# Patient Record
Sex: MALE | Race: WHITE | NOT HISPANIC OR LATINO | URBAN - METROPOLITAN AREA
[De-identification: names, ages, dates, MRNs, and addresses within clinical notes are randomized per-mention and may not be internally consistent; named-entity substitution may affect disease eponyms.]

---

## 2023-04-15 ENCOUNTER — EMERGENCY (EMERGENCY)
Facility: HOSPITAL | Age: 78
LOS: 0 days | Discharge: HOME | End: 2023-04-15
Attending: EMERGENCY MEDICINE
Payer: MEDICARE

## 2023-04-15 VITALS
OXYGEN SATURATION: 98 % | TEMPERATURE: 96 F | SYSTOLIC BLOOD PRESSURE: 166 MMHG | DIASTOLIC BLOOD PRESSURE: 78 MMHG | HEART RATE: 68 BPM

## 2023-04-15 VITALS — HEIGHT: 69 IN | WEIGHT: 220.02 LBS
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
## 2023-04-15 DIAGNOSIS — S81.812A LACERATION WITHOUT FOREIGN BODY, LEFT LOWER LEG, INITIAL ENCOUNTER: ICD-10-CM
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
## 2023-04-15 PROCEDURE — 73590 X-RAY EXAM OF LOWER LEG: CPT | Mod: LT

## 2023-04-15 PROCEDURE — 73590 X-RAY EXAM OF LOWER LEG: CPT | Mod: 26,LT

## 2023-04-15 PROCEDURE — 99284 EMERGENCY DEPT VISIT MOD MDM: CPT

## 2023-04-15 PROCEDURE — 99283 EMERGENCY DEPT VISIT LOW MDM: CPT | Mod: 25

## 2023-04-15 RX ORDER — CEPHALEXIN 500 MG
1 CAPSULE ORAL
Qty: 28 | Refills: 0
Start: 2023-04-15 | End: 2023-04-21

## 2023-04-15 NOTE — ED PROVIDER NOTE - ATTENDING APP SHARED VISIT CONTRIBUTION OF CARE
I have personally performed a history and physical exam on this patient and personally directed the management of the patient. 6 patient is a 77-year-old male presents emergency department for evaluation of left anterior tib-fib abrasion after 3 weeks prior hitting it against a piece of wood that he was carrying patient visited an urgent care and completed a course of Bactrim no fevers no chills no discharge patient is able ambulate no other trauma at this time    On physical exam patient has superficial abrasion to the left anterior aspect of the shin pedal pulses 2+ capillary refills normal patient is able ambulate no signs of ascending cellulitis at this time no signs of warmth no signs of other infection    Assessment plan patient has no signs of a sending cellulitis no signs of abscess well-healing wound we obtained x-ray which reveals no fracture per my independent evaluation patient completed a course of Bactrim I will add Keflex discharge at this time

## 2023-04-15 NOTE — ED PROVIDER NOTE - PHYSICAL EXAMINATION
Vital Signs: I have reviewed the initial vital signs.  Constitutional: well-nourished, no acute distress  Musculoskeletal: good ROM of extremity,  no bony tenderness, no deformity, good peripheral pulses  Integumentary: (+) anterior leg ulceration with granulation tissue . no surrounding erythema or crepitation , no fluctuation   Neurologic: awake, alert, extremities’ motor and sensory functions grossly intact, no focal deficits  heme: (-) no adenopathy (-)lymphangitis

## 2023-04-15 NOTE — ED PROVIDER NOTE - PATIENT PORTAL LINK FT
You can access the FollowMyHealth Patient Portal offered by [facility] by registering at the following website: [email or web address]. By joining [employer]’s FollowMyHealth portal, you will also be able to view your health information using other applications (apps) compatible with our system.

## 2023-04-15 NOTE — ED PROVIDER NOTE - CLINICAL SUMMARY MEDICAL DECISION MAKING FREE TEXT BOX
patient has no signs of a sending cellulitis no signs of abscess well-healing wound we obtained x-ray which reveals no fracture per my independent evaluation patient completed a course of Bactrim I will add Keflex discharge at this time

## 2023-04-15 NOTE — ED ADULT TRIAGE NOTE - CHIEF COMPLAINT QUOTE
pt complaining of swelling and pain around a wound left lower leg following an injury while working in backyard, denies fevers

## 2023-04-15 NOTE — ED PROVIDER NOTE - OBJECTIVE STATEMENT
76 y/o male presents to the Ed s/p direct blow to left lower leg with piece of wood sustaining skin wound . patient with scant amount of drainage . no surrounding erythema or streaking up leg. no fevers. patient completed course of bactrim last week.